# Patient Record
Sex: FEMALE
[De-identification: names, ages, dates, MRNs, and addresses within clinical notes are randomized per-mention and may not be internally consistent; named-entity substitution may affect disease eponyms.]

---

## 2022-07-02 ENCOUNTER — HOSPITAL ENCOUNTER (EMERGENCY)
Dept: HOSPITAL 5 - ED | Age: 67
Discharge: HOME | End: 2022-07-02
Payer: SELF-PAY

## 2022-07-02 VITALS — DIASTOLIC BLOOD PRESSURE: 77 MMHG | SYSTOLIC BLOOD PRESSURE: 133 MMHG

## 2022-07-02 DIAGNOSIS — K04.6: ICD-10-CM

## 2022-07-02 DIAGNOSIS — Z90.710: ICD-10-CM

## 2022-07-02 DIAGNOSIS — K02.9: ICD-10-CM

## 2022-07-02 DIAGNOSIS — Z98.890: ICD-10-CM

## 2022-07-02 DIAGNOSIS — M19.90: ICD-10-CM

## 2022-07-02 DIAGNOSIS — J45.909: ICD-10-CM

## 2022-07-02 DIAGNOSIS — G44.89: Primary | ICD-10-CM

## 2022-07-02 DIAGNOSIS — J01.10: ICD-10-CM

## 2022-07-02 PROCEDURE — 99282 EMERGENCY DEPT VISIT SF MDM: CPT

## 2022-07-02 NOTE — EMERGENCY DEPARTMENT REPORT
ED General Adult HPI





- General


Chief complaint: Headache


Stated complaint: SINUS INFECTION


Source: patient


Mode of arrival: Ambulatory


Limitations: No Limitations





- History of Present Illness


Initial comments: 





Patient is a 67-year-old  female with a history of chronic recurrent 

sinus complications due to seasonal allergies, asthma and chronic osteoarthritis

who presents to the ED with complaint of acute onset persistent frontal sinus 

pressure, nasal and sinus congestion and frontal headache, painful swollen 

maxillary gingiva with multiple dental pains and caries for the last 1 week, 

worse in the last 3 days.  Patient states that she has been taking 

over-the-counter medication with no relief.  Patient states that in the last 12 

hours she felt as if she had a fever as her body started shaking with chills.  

Patient denies dizziness, syncope, nausea and vomiting, chest pain or shortness 

of breath, sore throat, dysphagia, dysphonia, nosebleeds, hearing loss, change 

in vision, lightheadedness, abdominal pain or change in vision.


MD Complaint: frontal sinus pressure; multiple dental caries and pain


-: Sudden, days(s) (4)


Location: mouth


Radiation: non-radiation


Severity scale (0 -10): 9


Quality: aching, sharp


Consistency: constant


Improves with: none


Worsens with: none


Associated Symptoms: denies other symptoms, headaches, other (Dental pain, 

swollen gums, nasal and sinus congestion).  denies: confusion, chest pain, 

cough, diaphoresis, fever/chills, loss of appetite, malaise, nausea/vomiting, 

rash, seizure, shortness of breath, syncope, weakness


Treatments Prior to Arrival: none





- Related Data


                                  Previous Rx's











 Medication  Instructions  Recorded  Last Taken  Type


 


Amoxicillin/K Clav Tab [Augmentin 1 tab PO Q12HR #20 tab 07/02/22 Unknown Rx





875 mg]    


 


Butalb/Acetamin/Caff -40 1 - 2 tab PO Q6HR PRN #15 tab 07/02/22 Unknown Rx





[Fioricet -40]    


 


Cetirizine HCl [Zyrtec 10mg tab] 10 mg PO DAILY #30 tab 07/02/22 Unknown Rx


 


Ketorolac [Toradol] 10 mg PO Q8H PRN #20 tab 07/02/22 Unknown Rx











                                    Allergies











Allergy/AdvReac Type Severity Reaction Status Date / Time


 


No Known Allergies Allergy   Unverified 07/02/22 17:11














ED Review of Systems


ROS: 


Stated complaint: SINUS INFECTION


Other details as noted in HPI





Constitutional: denies: chills, fever


Eyes: denies: eye pain, eye discharge, vision change


ENT: dental pain (Multiple dental pain), congestion, other (Nasal and sinus 

congestion with pressure).  denies: ear pain, throat pain


Respiratory: denies: cough, shortness of breath, wheezing


Cardiovascular: denies: chest pain, palpitations


Endocrine: no symptoms reported


Gastrointestinal: denies: abdominal pain, nausea, vomiting, diarrhea


Genitourinary: denies: urgency, dysuria, discharge


Musculoskeletal: denies: back pain, joint swelling, arthralgia


Skin: denies: rash, lesions


Neurological: headache (Frontal sinus pressure and pain).  denies: weakness, 

paresthesias


Psychiatric: denies: anxiety, depression


Hematological/Lymphatic: denies: easy bleeding, easy bruising





ED Past Medical Hx





- Past Medical History


Previous Medical History?: Yes


Hx Arthritis: Yes


Hx Asthma: Yes


Additional medical history: Anxiety, sinus infections





- Surgical History


Past Surgical History?: Yes


Additional Surgical History: Sinus surgery, Hysterectomy 2019, left arm, T&A @ 

age 30





- Medications


Home Medications: 


                                Home Medications











 Medication  Instructions  Recorded  Confirmed  Last Taken  Type


 


Amoxicillin/K Clav Tab [Augmentin 1 tab PO Q12HR #20 tab 07/02/22  Unknown Rx





875 mg]     


 


Butalb/Acetamin/Caff -40 1 - 2 tab PO Q6HR PRN #15 tab 07/02/22  Unknown 

Rx





[Fioricet -40]     


 


Cetirizine HCl [Zyrtec 10mg tab] 10 mg PO DAILY #30 tab 07/02/22  Unknown Rx


 


Ketorolac [Toradol] 10 mg PO Q8H PRN #20 tab 07/02/22  Unknown Rx














ED Physical Exam





- General


Limitations: No Limitations


General appearance: alert, in no apparent distress





- Head


Head exam: Present: atraumatic, normocephalic, normal inspection





- Eye


Eye exam: Present: normal appearance, PERRL, EOMI


Pupils: Present: normal accommodation





- ENT


ENT exam: Present: mucous membranes moist, TM's normal bilaterally, normal 

external ear exam, other (Palpable frontal sinus tenderness; grossly congested 

nasal passages; multiple dental caries, palpable bilateral maxillary gingival 

tenderness and swelling)





- Neck


Neck exam: Present: normal inspection, full ROM.  Absent: tenderness





- Respiratory


Respiratory exam: Present: normal lung sounds bilaterally.  Absent: respiratory 

distress, wheezes, rales, rhonchi, chest wall tenderness, accessory muscle use, 

decreased breath sounds





- Cardiovascular


Cardiovascular Exam: Present: regular rate, normal rhythm, normal heart sounds. 

 Absent: systolic murmur, diastolic murmur, rubs, gallop





- GI/Abdominal


GI/Abdominal exam: Present: soft, normal bowel sounds.  Absent: tenderness, 

guarding, hyperactive bowel sounds





- Extremities Exam


Extremities exam: Present: normal inspection, full ROM, normal capillary refill.

  Absent: tenderness





- Back Exam


Back exam: Present: normal inspection, full ROM.  Absent: tenderness, CVA 

tenderness (R), CVA tenderness (L), muscle spasm, paraspinal tenderness, 

vertebral tenderness





- Neurological Exam


Neurological exam: Present: alert, oriented X3, CN II-XII intact, normal gait, 

reflexes normal





- Psychiatric


Psychiatric exam: Present: normal affect, normal mood





- Skin


Skin exam: Present: warm, dry, intact, normal color.  Absent: rash





ED Course





                                   Vital Signs











  07/02/22





  17:16


 


Temperature 99 F


 


Pulse Rate 81


 


Respiratory 20





Rate 


 


Blood Pressure 154/85





[Right] 


 


O2 Sat by Pulse 97





Oximetry 














ED Medical Decision Making





- Medical Decision Making





This is a 67-year-old  female with a history of chronic recurrent sinus

 complications due to seasonal allergies, asthma and chronic osteoarthritis who 

presents to the ED with complaint of acute onset persistent frontal sinus 

pressure, nasal and sinus congestion and frontal headache, painful swollen 

maxillary gingiva with multiple dental pains and caries for the last 1 week, 

worse in the last 3 days.  Patient states that she has been taking 

over-the-counter medication with no relief.  Patient states that in the last 12 

hours she felt as if she had a fever as her body started shaking with chills.  

In the ED, patient is alert and oriented x3 and is not in any distress but 

appears to be in pain.  Patient was treated in the ED with pain medications and 

also given initial oral antibiotics.  Patient was discharged home on medication 

for pain and antibiotics and advised to follow-up with her primary care 

physician and or dentist in 7 to 10 days for reevaluation or return to the ED 

immediately if symptoms get worse.





- Differential Diagnosis


Sinusitis; URI; dental abscess; dental caries; gingivitis


Critical care attestation.: 


If time is entered above; I have spent that time in minutes in the direct care 

of this critically ill patient, excluding procedure time.








ED Disposition


Clinical Impression: 


 Dental abscess, Dental caries, Acute gingivitis, Sinus headache





Acute frontal sinusitis


Qualifiers:


 Recurrence: non-recurrent Qualified Code(s): J01.10 - Acute frontal sinusitis, 

unspecified





Disposition: 01 HOME / SELF CARE / HOMELESS


Is pt being admited?: No


Does the pt Need Aspirin: No


Condition: Stable


Instructions:  Sinusitis, Adult, Easy-to-Read, Upper Respiratory Infection, 

Adult, Easy-to-Read, Dental Abscess, Easy-to-Read, Trench Mouth


Additional Instructions: 


Take medication with food, drink plenty of fluids and follow-up with your 

primary care physician or dentist in 7 to 10 days for reevaluation.  Return to 

the ED immediately if symptoms get worse.


Prescriptions: 


Amoxicillin/K Clav Tab [Augmentin 875 mg] 1 tab PO Q12HR #20 tab


Butalb/Acetamin/Caff -40 [Fioricet -40] 1 - 2 tab PO Q6HR PRN #15 

tab


 PRN Reason: Headache


Ketorolac [Toradol] 10 mg PO Q8H PRN #20 tab


 PRN Reason: Pain


Cetirizine HCl [Zyrtec 10mg tab] 10 mg PO DAILY #30 tab


Referrals: 


[facility] [Provider Group] - 7-10 days


Time of Disposition: 19:08


Print Language: ENGLISH